# Patient Record
Sex: FEMALE | Race: WHITE | NOT HISPANIC OR LATINO | Employment: FULL TIME | URBAN - METROPOLITAN AREA
[De-identification: names, ages, dates, MRNs, and addresses within clinical notes are randomized per-mention and may not be internally consistent; named-entity substitution may affect disease eponyms.]

---

## 2020-06-25 ENCOUNTER — OFFICE VISIT (OUTPATIENT)
Dept: OBGYN CLINIC | Facility: CLINIC | Age: 20
End: 2020-06-25
Payer: COMMERCIAL

## 2020-06-25 VITALS
BODY MASS INDEX: 38.59 KG/M2 | HEIGHT: 65 IN | WEIGHT: 231.6 LBS | DIASTOLIC BLOOD PRESSURE: 82 MMHG | SYSTOLIC BLOOD PRESSURE: 126 MMHG

## 2020-06-25 DIAGNOSIS — E66.9 OBESITY (BMI 35.0-39.9 WITHOUT COMORBIDITY): ICD-10-CM

## 2020-06-25 DIAGNOSIS — Z30.014 ENCOUNTER FOR INITIAL PRESCRIPTION OF INTRAUTERINE CONTRACEPTIVE DEVICE (IUD): ICD-10-CM

## 2020-06-25 DIAGNOSIS — Z11.3 SCREENING FOR STDS (SEXUALLY TRANSMITTED DISEASES): Primary | ICD-10-CM

## 2020-06-25 DIAGNOSIS — Z97.5 IUD CONTRACEPTION: ICD-10-CM

## 2020-06-25 PROCEDURE — 99203 OFFICE O/P NEW LOW 30 MIN: CPT | Performed by: OBSTETRICS & GYNECOLOGY

## 2020-06-25 PROCEDURE — 87491 CHLMYD TRACH DNA AMP PROBE: CPT | Performed by: OBSTETRICS & GYNECOLOGY

## 2020-06-25 PROCEDURE — 87591 N.GONORRHOEAE DNA AMP PROB: CPT | Performed by: OBSTETRICS & GYNECOLOGY

## 2020-06-25 RX ORDER — LORATADINE 10 MG/1
TABLET ORAL
COMMUNITY

## 2020-06-27 LAB
C TRACH DNA SPEC QL NAA+PROBE: NEGATIVE
N GONORRHOEA DNA SPEC QL NAA+PROBE: NEGATIVE

## 2020-07-07 ENCOUNTER — PROCEDURE VISIT (OUTPATIENT)
Dept: OBGYN CLINIC | Facility: CLINIC | Age: 20
End: 2020-07-07
Payer: COMMERCIAL

## 2020-07-07 VITALS — HEIGHT: 65 IN | BODY MASS INDEX: 38.54 KG/M2

## 2020-07-07 DIAGNOSIS — Z30.430 ENCOUNTER FOR INSERTION OF INTRAUTERINE CONTRACEPTIVE DEVICE (IUD): Primary | ICD-10-CM

## 2020-07-07 DIAGNOSIS — Z30.430 ENCOUNTER FOR IUD INSERTION: ICD-10-CM

## 2020-07-07 PROCEDURE — 58300 INSERT INTRAUTERINE DEVICE: CPT | Performed by: OBSTETRICS & GYNECOLOGY

## 2020-07-07 NOTE — PROGRESS NOTES
Iud insertions  Date/Time: 7/7/2020 2:48 PM  Performed by: Claudia Rios DO  Authorized by: Claudia Rios DO     Consent:     Consent obtained:  Written    Consent given by:  Patient    Procedure risks and benefits discussed: yes      Patient questions answered: yes      Patient agrees, verbalizes understanding, and wants to proceed: yes      Educational handouts given: yes      Instructions and paperwork completed: yes    Procedure:     Pelvic exam performed: yes      Negative GC/chlamydia test: yes      Negative urine pregnancy test: yes      Negative serum pregnancy test: no      Cervix cleaned and prepped: yes      Speculum placed in vagina: yes      Tenaculum applied to cervix: aliss  Uterus sounded: yes      Uterus sound depth (cm):  7    IUD inserted with no complications: yes      IUD type: [place]  Strings trimmed: yes    Post-procedure:     Patient tolerated procedure well: yes      Patient will follow up after next period: yes    Comments: Follow-up in 6 weeks and p r n , IUD inserted without difficulty

## 2021-05-19 ENCOUNTER — HOSPITAL ENCOUNTER (EMERGENCY)
Facility: HOSPITAL | Age: 21
Discharge: HOME | End: 2021-05-19
Attending: EMERGENCY MEDICINE
Payer: COMMERCIAL

## 2021-05-19 VITALS
RESPIRATION RATE: 18 BRPM | HEART RATE: 95 BPM | SYSTOLIC BLOOD PRESSURE: 134 MMHG | TEMPERATURE: 97.8 F | DIASTOLIC BLOOD PRESSURE: 80 MMHG | OXYGEN SATURATION: 98 %

## 2021-05-19 DIAGNOSIS — R20.2 PARESTHESIAS: Primary | ICD-10-CM

## 2021-05-19 PROCEDURE — 99283 EMERGENCY DEPT VISIT LOW MDM: CPT

## 2021-05-19 PROCEDURE — 99282 EMERGENCY DEPT VISIT SF MDM: CPT | Performed by: EMERGENCY MEDICINE

## 2021-05-19 NOTE — ED PROVIDER NOTES
History  Chief Complaint   Patient presents with    Numbness     Patient reports 2 days ago got her second covid vaccine in her left arm, today she woke up with left sided numbess in her mouth, off and on  Denies vision changes/no facial droop     18-year-old female presents with chief complaint of left arm paresthesias  Patient reports this started today  Patient reports she occasionally has some numbness in her mouth as well  However it is the arm numbness the concerns or more  Patient reports this is the arm that she received her 2nd immunization of the COVID vaccine in 2 days ago  There are no skin changes or noticeable swelling to the extremity  Patient has intact sensation and normal perfusion  Patient describes a pins and needle sensation  History provided by:  Patient   used: No    Medical Problem  Location:  Left arm  Quality:  Pins and needle sensation  Severity:  Mild  Onset quality:  Sudden  Duration:  1 day  Timing:  Constant  Progression:  Unchanged  Chronicity:  New  Context:  Recent COVID vaccination in the same arm (2nd dose 2 days ago)  Relieved by:  Nothing  Worsened by:  Nothing  Ineffective treatments:  Nothing tried  Associated symptoms: no chest pain, no diarrhea, no fever, no nausea, no rash, no shortness of breath and no vomiting        Prior to Admission Medications   Prescriptions Last Dose Informant Patient Reported? Taking? SPRINTEC 28 0 25-35 MG-MCG per tablet   Yes No   loratadine (CLARITIN) 10 mg tablet   Yes No   Si tab(s)      Facility-Administered Medications: None       History reviewed  No pertinent past medical history  History reviewed  No pertinent surgical history  Family History   Problem Relation Age of Onset    Thyroid disease Mother     Breast cancer Mother     No Known Problems Father      I have reviewed and agree with the history as documented      E-Cigarette/Vaping    E-Cigarette Use Never User E-Cigarette/Vaping Substances     Social History     Tobacco Use    Smoking status: Never Smoker    Smokeless tobacco: Never Used   Substance Use Topics    Alcohol use: Not Currently    Drug use: Never       Review of Systems   Constitutional: Negative for chills, diaphoresis and fever  Respiratory: Negative for shortness of breath  Cardiovascular: Negative for chest pain and palpitations  Gastrointestinal: Negative for diarrhea, nausea and vomiting  Genitourinary: Negative for dysuria and frequency  Skin: Negative for rash  Neurological: Positive for numbness (Paresthesias left arm)  All other systems reviewed and are negative  Physical Exam  Physical Exam  Vitals signs and nursing note reviewed  Constitutional:       General: She is not in acute distress  Appearance: She is well-developed  She is obese  HENT:      Head: Normocephalic and atraumatic  Eyes:      Pupils: Pupils are equal, round, and reactive to light  Neck:      Musculoskeletal: Normal range of motion  Vascular: No JVD  Cardiovascular:      Rate and Rhythm: Normal rate and regular rhythm  Heart sounds: Normal heart sounds  No murmur  No friction rub  No gallop  Pulmonary:      Effort: Pulmonary effort is normal  No respiratory distress  Breath sounds: Normal breath sounds  No wheezing or rales  Chest:      Chest wall: No tenderness  Musculoskeletal: Normal range of motion  General: No tenderness  Skin:     General: Skin is warm and dry  Findings: No bruising, erythema or rash  Neurological:      General: No focal deficit present  Mental Status: She is alert and oriented to person, place, and time  Sensory: Sensation is intact  Motor: Motor function is intact  Psychiatric:         Behavior: Behavior normal          Thought Content:  Thought content normal          Judgment: Judgment normal          Vital Signs  ED Triage Vitals [05/19/21 1213] Temperature Pulse Respirations Blood Pressure SpO2   97 8 °F (36 6 °C) 95 18 134/80 98 %      Temp Source Heart Rate Source Patient Position - Orthostatic VS BP Location FiO2 (%)   Oral Monitor Lying Right arm --      Pain Score       --           Vitals:    05/19/21 1213   BP: 134/80   Pulse: 95   Patient Position - Orthostatic VS: Lying         Visual Acuity      ED Medications  Medications - No data to display    Diagnostic Studies  Results Reviewed     None                 No orders to display              Procedures  Procedures         ED Course                                           MDM  Number of Diagnoses or Management Options  Paresthesias: new and does not require workup  Diagnosis management comments: Patient with mild paresthesias of the left arm following a vaccination  Suspect local inflammation and irritation from vaccine causing slight compression or peripheral nerves  Anticipate resolution of symptoms within a day or 2  Patient instructed to return should symptoms worsen or not improve over the course of expected time  Patient Progress  Patient progress: stable      Disposition  Final diagnoses:   Paresthesias - acute left arm     Time reflects when diagnosis was documented in both MDM as applicable and the Disposition within this note     Time User Action Codes Description Comment    [facility]19[facility]2021  2:11 [facility] [R20 2] Paresthesias     5/19/2021  2:12 PM Doristine Area B Modify [R20 2] Paresthesias acute left arm      ED Disposition     ED Disposition Condition Date/Time Comment    Discharge Stable Wed May 19, 2021  2:12 PM Yvone Room discharge to home/self care              Follow-up Information    None         Discharge Medication List as of 5/19/2021  2:12 PM      CONTINUE these medications which have NOT CHANGED    Details   loratadine (CLARITIN) 10 mg tablet 1 tab(s), Historical Med      SPRINTEC 28 0 25-35 MG-MCG per tablet Starting Sat 6/13/2020, Historical Med No discharge procedures on file      PDMP Review     None          ED Provider  Electronically Signed by           Nessa Lucio MD  05/20/21 9531

## 2021-12-03 ENCOUNTER — OFFICE VISIT (OUTPATIENT)
Dept: URGENT CARE | Facility: CLINIC | Age: 21
End: 2021-12-03
Payer: COMMERCIAL

## 2021-12-03 VITALS
SYSTOLIC BLOOD PRESSURE: 112 MMHG | WEIGHT: 250.4 LBS | TEMPERATURE: 96.5 F | OXYGEN SATURATION: 99 % | HEIGHT: 61 IN | RESPIRATION RATE: 16 BRPM | DIASTOLIC BLOOD PRESSURE: 64 MMHG | HEART RATE: 80 BPM | BODY MASS INDEX: 47.28 KG/M2

## 2021-12-03 DIAGNOSIS — R35.0 URINE FREQUENCY: Primary | ICD-10-CM

## 2021-12-03 LAB
SL AMB  POCT GLUCOSE, UA: ABNORMAL
SL AMB LEUKOCYTE ESTERASE,UA: ABNORMAL
SL AMB POCT BILIRUBIN,UA: ABNORMAL
SL AMB POCT BLOOD,UA: ABNORMAL
SL AMB POCT CLARITY,UA: YELLOW
SL AMB POCT COLOR,UA: ABNORMAL
SL AMB POCT KETONES,UA: ABNORMAL
SL AMB POCT NITRITE,UA: ABNORMAL
SL AMB POCT PH,UA: 6
SL AMB POCT SPECIFIC GRAVITY,UA: 1.02
SL AMB POCT URINE PROTEIN: ABNORMAL
SL AMB POCT UROBILINOGEN: 0.2

## 2021-12-03 PROCEDURE — 81002 URINALYSIS NONAUTO W/O SCOPE: CPT | Performed by: PHYSICIAN ASSISTANT

## 2021-12-03 PROCEDURE — G0382 LEV 3 HOSP TYPE B ED VISIT: HCPCS | Performed by: PHYSICIAN ASSISTANT

## 2021-12-03 PROCEDURE — S9083 URGENT CARE CENTER GLOBAL: HCPCS | Performed by: PHYSICIAN ASSISTANT

## 2021-12-03 PROCEDURE — 87086 URINE CULTURE/COLONY COUNT: CPT | Performed by: PHYSICIAN ASSISTANT

## 2021-12-03 PROCEDURE — 87147 CULTURE TYPE IMMUNOLOGIC: CPT | Performed by: PHYSICIAN ASSISTANT

## 2021-12-03 RX ORDER — CIPROFLOXACIN 500 MG/1
500 TABLET, FILM COATED ORAL EVERY 12 HOURS SCHEDULED
Qty: 14 TABLET | Refills: 0 | Status: SHIPPED | OUTPATIENT
Start: 2021-12-03 | End: 2021-12-03

## 2021-12-04 LAB
BACTERIA UR CULT: ABNORMAL
BACTERIA UR CULT: ABNORMAL

## 2022-01-03 ENCOUNTER — OFFICE VISIT (OUTPATIENT)
Dept: URGENT CARE | Facility: CLINIC | Age: 22
End: 2022-01-03
Payer: COMMERCIAL

## 2022-01-03 VITALS
WEIGHT: 250 LBS | BODY MASS INDEX: 47.2 KG/M2 | HEART RATE: 108 BPM | TEMPERATURE: 98.3 F | OXYGEN SATURATION: 97 % | RESPIRATION RATE: 18 BRPM | HEIGHT: 61 IN

## 2022-01-03 DIAGNOSIS — Z11.59 SPECIAL SCREENING EXAMINATION FOR VIRAL DISEASE: Primary | ICD-10-CM

## 2022-01-03 PROCEDURE — S9083 URGENT CARE CENTER GLOBAL: HCPCS | Performed by: PHYSICIAN ASSISTANT

## 2022-01-03 PROCEDURE — U0003 INFECTIOUS AGENT DETECTION BY NUCLEIC ACID (DNA OR RNA); SEVERE ACUTE RESPIRATORY SYNDROME CORONAVIRUS 2 (SARS-COV-2) (CORONAVIRUS DISEASE [COVID-19]), AMPLIFIED PROBE TECHNIQUE, MAKING USE OF HIGH THROUGHPUT TECHNOLOGIES AS DESCRIBED BY CMS-2020-01-R: HCPCS | Performed by: PHYSICIAN ASSISTANT

## 2022-01-03 PROCEDURE — G0382 LEV 3 HOSP TYPE B ED VISIT: HCPCS | Performed by: PHYSICIAN ASSISTANT

## 2022-01-03 PROCEDURE — U0005 INFEC AGEN DETEC AMPLI PROBE: HCPCS | Performed by: PHYSICIAN ASSISTANT

## 2022-01-03 NOTE — LETTER
January 3, 2022     Patient: Arash Hugo   YOB: 2000   Date of Visit: 1/3/2022       To Whom it May Concern:    Arash Hugo was seen in my clinic on 1/3/2022  Please excuse her from work through 1/5/2022        Sincerely,          Shannon Whitaker PA-C        CC: No Recipients

## 2022-01-03 NOTE — PATIENT INSTRUCTIONS
Swabbed for COVID-19, discussed self quarantine until contacted with results  Monitor for worsening symptoms  C/w OTC symptom relief including tylenol, fluids, rest  Recommend supplementing with vitamins D & C as well as a multivitamin

## 2022-01-03 NOTE — PROGRESS NOTES
[facility]        NAME: Serjio Smith is a 24 y o  female  : 2000    MRN: [id number]  DATE: January 3, 2022  TIME: 4:49 PM    Assessment and Plan   Special screening examination for viral disease [Z11 59]  1  Special screening examination for viral disease  COVID Only -Office Collect         Patient Instructions     Swabbed for COVID-19, discussed self quarantine until contacted with results  Monitor for worsening symptoms  C/w OTC symptom relief including tylenol, fluids, rest  Recommend supplementing with vitamins D & C as well as a multivitamin    Follow up with PCP in 3-5 days  Proceed to  ER if symptoms worsen  Chief Complaint     Chief Complaint   Patient presents with    Sinusitis     head cold started last night    COVID-19     pos  on home test today    Covid-19 Vaccine     had 2 shots, did not have booster         History of Present Illness       Patient presents for COVID-19 testing  Pt reports symptoms of headache, congestion, and cough  Pt denies fever, chills, sweats, chest tightness, dyspnea, abdominal pain, nausea, vomiting, and diarrhea  Pt denies history of underlying medical problems such as asthma  Pt reports taking OTC symptom relief without significant improvement  Pt denies recent known COVID exposure  Review of Systems   Review of Systems   Constitutional: Negative for chills, diaphoresis and fever  HENT: Positive for congestion  Negative for ear pain and sore throat  Eyes: Negative for pain and visual disturbance  Respiratory: Positive for cough  Negative for chest tightness and shortness of breath  Cardiovascular: Negative for chest pain and palpitations  Gastrointestinal: Negative for abdominal pain and vomiting  Genitourinary: Negative for dysuria and hematuria  Musculoskeletal: Negative for arthralgias and back pain  Skin: Negative for color change and rash  Neurological: Positive for headaches  Negative for seizures and syncope  All other systems reviewed and are negative  Current Medications       Current Outpatient Medications:     loratadine (CLARITIN) 10 mg tablet, 1 tab(s) (Patient not taking: Reported on 1/3/2022), Disp: , Rfl:     SPRINTEC 28 0 25-35 MG-MCG per tablet, , Disp: , Rfl:     Current Allergies     Allergies as of 01/03/2022    (No Known Allergies)            The following portions of the patient's history were reviewed and updated as appropriate: allergies, current medications, past family history, past medical history, past social history, past surgical history and problem list      No past medical history on file  No past surgical history on file  Family History   Problem Relation Age of Onset    Thyroid disease Mother     Breast cancer Mother     No Known Problems Father          Medications have been verified  Objective   Pulse (!) 108   Temp 98 3 °F (36 8 °C)   Resp 18   Ht 5' 1" (1 549 m)   Wt 113 kg (250 lb)   SpO2 97%   BMI 47 24 kg/m²   No LMP recorded  Physical Exam     Physical Exam  Vitals and nursing note reviewed  Constitutional:       General: She is not in acute distress  Appearance: Normal appearance  She is well-developed  She is not ill-appearing or diaphoretic  HENT:      Head: Normocephalic and atraumatic  Right Ear: Tympanic membrane, ear canal and external ear normal       Left Ear: Tympanic membrane, ear canal and external ear normal       Nose: Nose normal       Mouth/Throat:      Mouth: Mucous membranes are moist       Pharynx: Oropharynx is clear  No oropharyngeal exudate or posterior oropharyngeal erythema  Eyes:      Conjunctiva/sclera: Conjunctivae normal       Pupils: Pupils are equal, round, and reactive to light  Cardiovascular:      Rate and Rhythm: Normal rate and regular rhythm  Heart sounds: Normal heart sounds  Pulmonary:      Effort: Pulmonary effort is normal  No respiratory distress        Breath sounds: Normal breath sounds  No stridor  No wheezing, rhonchi or rales  Musculoskeletal:      Cervical back: Normal range of motion and neck supple  Lymphadenopathy:      Cervical: No cervical adenopathy  Skin:     General: Skin is warm and dry  Capillary Refill: Capillary refill takes less than 2 seconds  Findings: No rash  Neurological:      Mental Status: She is alert and oriented to person, place, and time  Cranial Nerves: No cranial nerve deficit  Sensory: No sensory deficit  Psychiatric:         Behavior: Behavior normal          Thought Content:  Thought content normal

## 2022-01-05 LAB — SARS-COV-2 RNA RESP QL NAA+PROBE: POSITIVE

## 2025-05-13 ENCOUNTER — OFFICE VISIT (OUTPATIENT)
Dept: URGENT CARE | Age: 25
End: 2025-05-13
Payer: COMMERCIAL

## 2025-05-13 VITALS
HEART RATE: 82 BPM | TEMPERATURE: 98.1 F | SYSTOLIC BLOOD PRESSURE: 132 MMHG | RESPIRATION RATE: 16 BRPM | OXYGEN SATURATION: 99 % | DIASTOLIC BLOOD PRESSURE: 78 MMHG

## 2025-05-13 DIAGNOSIS — J02.9 ACUTE PHARYNGITIS, UNSPECIFIED ETIOLOGY: Primary | ICD-10-CM

## 2025-05-13 LAB — S PYO AG THROAT QL: NEGATIVE

## 2025-05-13 PROCEDURE — G0382 LEV 3 HOSP TYPE B ED VISIT: HCPCS

## 2025-05-13 PROCEDURE — 87880 STREP A ASSAY W/OPTIC: CPT

## 2025-05-13 PROCEDURE — S9083 URGENT CARE CENTER GLOBAL: HCPCS

## 2025-05-13 RX ORDER — METHYLPREDNISOLONE 4 MG/1
TABLET ORAL
Qty: 21 TABLET | Refills: 0 | Status: SHIPPED | OUTPATIENT
Start: 2025-05-13

## 2025-05-13 NOTE — PROGRESS NOTES
[facility]        NAME: Jasmina Page is a 24 y.o. female  : 2000    MRN: [id number]  DATE: May 13, 2025  TIME: 12:23 PM    Assessment and Plan   Acute pharyngitis, unspecified etiology [J02.9]  1. Acute pharyngitis, unspecified etiology  POCT rapid ANTIGEN strepA    methylPREDNISolone 4 MG tablet therapy pack        Sore throat, congestion, PND. No fevers. No meds taken. Unsure if it was allergies or strep. Rapid neg    Patient Instructions       Follow up with PCP in 3-5 days.  Proceed to  ER if symptoms worsen.    If tests have been performed at [facility], our office will contact you with results if changes need to be made to the care plan discussed with you at the visit.  [facility]    Chief Complaint     Chief Complaint   Patient presents with    Sore Throat     Patient has a sore throat that started today upon waking up. Has been dealing with some nasal congestion for the past few days         History of Present Illness       Sore throat, congestion, PND. No fevers. No meds taken. Unsure if it was allergies or strep. Rapid neg    Sore Throat   Associated symptoms include congestion. Pertinent negatives include no coughing or shortness of breath.       Review of Systems   Review of Systems   Constitutional:  Negative for activity change, appetite change and fever.   HENT:  Positive for congestion, postnasal drip and sore throat.    Respiratory:  Negative for cough, shortness of breath and wheezing.    Allergic/Immunologic: Positive for environmental allergies.         Current Medications       Current Outpatient Medications:     levonorgestrel (JUAN) 13.5 MG intrauterine device, 1 Intra Uterine Device by Intrauterine Device route Once every 3 years, Disp: , Rfl:     methylPREDNISolone 4 MG tablet therapy pack, Use as directed on package, Disp: 21 tablet, Rfl: 0    loratadine (CLARITIN) 10 mg tablet, 1 tab(s) (Patient not taking: No sig  reported), Disp: , Rfl:     SPRINTEC 28 0.25-35 MG-MCG per tablet, , Disp: , Rfl:     Current Allergies     Allergies as of 05/13/2025    (No Known Allergies)            The following portions of the patient's history were reviewed and updated as appropriate: allergies, current medications, past family history, past medical history, past social history, past surgical history and problem list.     No past medical history on file.    No past surgical history on file.    Family History   Problem Relation Age of Onset    Thyroid disease Mother     Breast cancer Mother     No Known Problems Father          Medications have been verified.        Objective   /78   Pulse 82   Temp 98.1 °F (36.7 °C)   Resp 16   SpO2 99%   No LMP recorded.       Physical Exam     Physical Exam  Vitals reviewed.   Constitutional:       Appearance: She is well-developed.   HENT:      Right Ear: Tympanic membrane normal.      Left Ear: Tympanic membrane normal.      Nose: Congestion present.      Mouth/Throat:      Pharynx: No posterior oropharyngeal erythema.      Tonsils: No tonsillar exudate.   Cardiovascular:      Rate and Rhythm: Normal rate and regular rhythm.   Pulmonary:      Effort: Pulmonary effort is normal.      Breath sounds: Normal breath sounds.   Neurological:      Mental Status: She is alert.